# Patient Record
(demographics unavailable — no encounter records)

---

## 2025-04-28 NOTE — HISTORY OF PRESENT ILLNESS
[FreeTextEntry1] :  Subjective:   - Summary: Patient presents for follow-up care with bilateral foot pain, particularly in the right fourth metatarsal head, and painful elongated toenails.   - Chief Complaint (CC): Bilateral foot pain and toenail issues   - History of Present Illness (HPI): Patient reports painful bilateral bunions, with specific pain in the right fourth metatarsal head. Patient also complains of elongated painful onychomycotic toenails on all digits. No changes in past medical history reported.-      Objective:   - Diagnostic Results:    - Vital Signs:    - Physical Examination (PE): Dorsalis Pedis pulse: 1/4 bilaterally. Posterior Tibial pulse: 1/4 bilaterally. Temperature gradients within normal limits bilaterally. Capillary return: 2 seconds times  ten. There are thickened, elongated, painful, onychomycotic, onychogryphotic. dystrophic toenails times ten.  The are HAV deformities B/L with a palpable bursa B/L.  There is a 4 cm X 3 cm IPK sub the right fourth metatarsal head which is painful to palpation.   Assessment:       - Onychomycosis     - Onychogryphosis     - Bilateral hallux valgus with bursitis     - IPK sub right fourth metatarsal head         - Plan:     - Aseptic mechanical debridement of thickened, elongated, painful, onychomycotic, onychogryphotic, dystrophic toenails on all digits     - Inject bilateral first metatarsophalangeal joints with 1.5 cc each of dexamethasone phosphate and 2% lidocaine plain.     - Aseptic debridement of IPK sub right 4th metatarsal head with verruca freeze, salinociane, and dispersive pad applied to the base.     - Follow-up appointment in two months

## 2025-04-28 NOTE — PROCEDURE
[FreeTextEntry1] : Inject bilateral first MPJ's with 1.5 cc each of Dexamethasone Phosphate/2% Lidocaine plain.  Aseptic mechanical debridement of thickened, elongated, dystrophic, painful mycotic toenails times ten. -Aseptic debridement of IPK sub right 4th metatarsal head with verruca freeze, salinocaine and dispersive pad being applied to the base.

## 2025-05-04 NOTE — REASON FOR VISIT
[Follow-Up Visit] : a follow-up visit for [FreeTextEntry2] : Right foot 5th MPJ region pain, calluses, bunions, hammertoes

## 2025-05-04 NOTE — ASSESSMENT
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 Findings were shown and discussed with the patient fully and in detail. 2) Left foot x-rays 05/03/2025: no evidence of fracture, no dislocation, mild 1st MPJ osteoarthritis with dorsal spur and bunion, no significant swelling, no soft tissue emphysema. Findings were shown and discussed with the patient fully and in detail. 3) Right foot 5th MPJ pain due to bursitis with tenderness to palpation, there is no fracture or osseous pathology: utilizing aseptic technique with alcohol sterile prep, utilizing ethyl chloride topical anesthesia, a therapeutic injection mixture of 1.5 cc of Kenalog 10mg/mL + 1.7 cc of 2% lidocaine 20mg/mL with epinephrine 1:09174 (Lot # [id number], Exp 10/31/2026, NDC# [address]-15) was administered. Dispensed and applied offloading relief donut hole felt pads to protect Right 5th metatarsal head from untoward pressure and friction. Patient was educated on proper use and application. 4) Callus: aseptic debridement and paring of painful foot callus x 2 5) Bunion: recommend use of protective padding, use of proper fit wide toe box shoes with accommodative insoles/orthotics 6) Hammertoes: recommend use of proper fit high toe box shoes with accommodative insoles, check feet for any new lesions daily, seek treatment immediately if present 7) Right foot osteoarthritic pain: Rx lidocaine 5% topical ointment to apply prn  Patient tolerated all treatments well and without any complications Follow up in 3 months [Verbal] : verbal [Patient] : patient [Good - alert, interested, motivated] : Good - alert, interested, motivated [Verbalizes knowledge/Understanding] : Verbalizes knowledge/understanding [Pt responsibility to plan of care] : patient responsibility to plan of care

## 2025-05-04 NOTE — PHYSICAL EXAM
[General Appearance - Alert] : alert [General Appearance - In No Acute Distress] : in no acute distress [General Appearance - Well Developed] : well developed [Delayed in the Right Toes] : capillary refills normal in right toes [Delayed in the Left Toes] : capillary refills normal in the left toes [1+] : left foot dorsalis pedis 1+ [] : normal strength/tone [de-identified] : ROM of ankle, subtalar, midtarsal, MPJ, IPJ are adequate and nontender bilateral 5/5 muscle power in inversion, eversion, dorsiflexion, plantarflexion bilateral [Skin Turgor] : normal skin turgor [Foot Ulcer] : no foot ulcer [FreeTextEntry1] : gross epicritic sensation to feet diminished, light touch sensation diminished, sharp/dull sensation intact [Oriented To Time, Place, And Person] : oriented to person, place, and time [Impaired Insight] : insight and judgment were intact [Affect] : the affect was normal

## 2025-05-04 NOTE — REVIEW OF SYSTEMS
[Arthralgias] : arthralgias [Joint Swelling] : joint swelling [Joint Stiffness] : joint stiffness [Limb Pain] : no limb pain [Skin Lesions] : skin lesion [Limb Swelling] : no limb swelling [Skin Wound] : no skin wound [Itching] : no itching [Confused] : no confusion [Convulsions] : no convulsions [Dizziness] : no dizziness [Fainting] : no fainting [Negative] : Psychiatric

## 2025-05-04 NOTE — HISTORY OF PRESENT ILLNESS
[FreeTextEntry1] : Ms. KIM SNOW is a 80 year female who is seen in the office for Right foot 5th MPJ region pain, calluses, bunions, hammertoes. Patient denies any current or recent fever, chills, nausea, vomiting, SOB, or chest pain. AAOx3 and in NAD. Patient mentions she had corticosteroid injections to her Left and Right bunions on 4/28/25, has residual pain to Right foot 5th MPJ region.

## 2025-05-04 NOTE — PROCEDURE
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 Findings were shown and discussed with the patient fully and in detail. 2) Left foot x-rays 05/03/2025: no evidence of fracture, no dislocation, mild 1st MPJ osteoarthritis with dorsal spur and bunion, no significant swelling, no soft tissue emphysema. Findings were shown and discussed with the patient fully and in detail. 3) Right foot 5th MPJ pain due to bursitis with tenderness to palpation, there is no fracture or osseous pathology: utilizing aseptic technique with alcohol sterile prep, utilizing ethyl chloride topical anesthesia, a therapeutic injection mixture of 1.5 cc of Kenalog 10mg/mL + 1.7 cc of 2% lidocaine 20mg/mL with epinephrine 1:70743 (Lot # [id number], Exp 10/31/2026, NDC# [address]-15) was administered. Dispensed and applied offloading relief donut hole felt pads to protect Right 5th metatarsal head from untoward pressure and friction. Patient was educated on proper use and application. 4) Callus: aseptic debridement and paring of painful foot callus x 2  Patient tolerated all treatments well and without any complications